# Patient Record
Sex: MALE | Race: OTHER | HISPANIC OR LATINO | URBAN - METROPOLITAN AREA
[De-identification: names, ages, dates, MRNs, and addresses within clinical notes are randomized per-mention and may not be internally consistent; named-entity substitution may affect disease eponyms.]

---

## 2018-07-20 ENCOUNTER — EMERGENCY (EMERGENCY)
Facility: HOSPITAL | Age: 53
LOS: 1 days | Discharge: HOME | End: 2018-07-20
Attending: EMERGENCY MEDICINE
Payer: COMMERCIAL

## 2018-07-20 VITALS
WEIGHT: 199.96 LBS | RESPIRATION RATE: 18 BRPM | SYSTOLIC BLOOD PRESSURE: 117 MMHG | HEART RATE: 77 BPM | TEMPERATURE: 98 F | DIASTOLIC BLOOD PRESSURE: 72 MMHG

## 2018-07-20 PROCEDURE — 72100 X-RAY EXAM L-S SPINE 2/3 VWS: CPT | Mod: 26

## 2018-07-20 PROCEDURE — 72100 X-RAY EXAM L-S SPINE 2/3 VWS: CPT

## 2018-07-20 PROCEDURE — 73502 X-RAY EXAM HIP UNI 2-3 VIEWS: CPT | Mod: 26,LT

## 2018-07-20 PROCEDURE — 99284 EMERGENCY DEPT VISIT MOD MDM: CPT

## 2018-07-20 PROCEDURE — 73502 X-RAY EXAM HIP UNI 2-3 VIEWS: CPT

## 2018-07-20 PROCEDURE — 99283 EMERGENCY DEPT VISIT LOW MDM: CPT

## 2018-07-20 RX ORDER — CYCLOBENZAPRINE HYDROCHLORIDE 10 MG/1
10 TABLET, FILM COATED ORAL ONCE
Qty: 0 | Refills: 0 | Status: COMPLETED | OUTPATIENT
Start: 2018-07-20 | End: 2018-07-20

## 2018-07-20 RX ADMIN — CYCLOBENZAPRINE HYDROCHLORIDE 10 MILLIGRAM(S): 10 TABLET, FILM COATED ORAL at 21:33

## 2018-07-20 RX ADMIN — Medication 20 MILLIGRAM(S): at 22:25

## 2018-07-20 NOTE — ED PROVIDER NOTE - PROGRESS NOTE DETAILS
Patient reassessed, now sitting up on the stretcher with legs crossed. He is able to swing around and hang legs off stretcher and hip flex with the left hip to 45 degrees. Sensation remains intact bilateral legs, reflexes remain 2+ bilateral patellae. Patient states his pain has almost completely resolved and wants to go home. Will send Rx to the pharmacy. Patient wants to follow up with his PMD in [place].

## 2018-07-20 NOTE — ED ADULT NURSE NOTE - OBJECTIVE STATEMENT
Pt c/o L thigh pain, no acute s/s of respiratory distress noted or reported, will continue to monitor

## 2018-07-20 NOTE — ED PROVIDER NOTE - PHYSICAL EXAMINATION
Const: Awake, alert and oriented. In no acute distress. Well appearing. Pt laying on right side with b/l knees and hips flexed  HEENT: NC/AT. Moist mucous membranes.  Eyes: No scleral icterus. EOMI.  Neck:. Soft and supple. Full ROM without pain.  Cardiac: Regular rate and rhythm. +S1/S2. No murmurs. Peripheral pulses 2+ and symmetric. No LE edema.  Resp: Speaking in full sentences. No evidence of respiratory distress. No wheezes, rales or rhonchi.  Abd: Soft, non-tender, non-distended. Normal bowel sounds in all 4 quadrants. No guarding or rebound.  MSK: pelvis stable. Full painless ROM of left knee and ankle and full passive ROM of left hip. no tend to palpation of greater trochanter   Back: Spine midline and non-tender. No CVAT. no palpable step-offs  Skin: No rashes, abrasions or lacerations. No ecchymosis, no hypertonicity or firmness of quad muscle  Lymph: No cervical lymphadenopathy.
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
## 2018-07-20 NOTE — ED PROVIDER NOTE - NS ED ROS FT
Const: Denies fever, chills  HEENT: Denies blurry vision, sore throat  Neck: Denies neck pain/stiffness  Resp: Denies coughing, SOB  Cardiovascular: Denies CP, palpitations, LE edema  GI: Deneis nausea, vomiting, abdominal pain, diarrhea, constipation, blood in stool  : Denies urinary frequency/urgency/dysuria, hematuria  MSK: + Left hip pain. Denies back pain  Neuro: Denies HA, dizziness, numbness, weakness  Skin: Denies rashes.
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
## 2018-07-21 RX ORDER — CYCLOBENZAPRINE HYDROCHLORIDE 10 MG/1
1 TABLET, FILM COATED ORAL
Qty: 12 | Refills: 0 | OUTPATIENT
Start: 2018-07-21 | End: 2018-07-24

## 2018-07-21 RX ORDER — IBUPROFEN 200 MG
1 TABLET ORAL
Qty: 9 | Refills: 0 | OUTPATIENT
Start: 2018-07-21 | End: 2018-07-23

## 2021-02-15 NOTE — ED ADULT TRIAGE NOTE - WEIGHT IN LBS
199.9 Protopic Pregnancy And Lactation Text: This medication is Pregnancy Category C. It is unknown if this medication is excreted in breast milk when applied topically.
